# Patient Record
Sex: MALE | Race: WHITE | NOT HISPANIC OR LATINO | URBAN - METROPOLITAN AREA
[De-identification: names, ages, dates, MRNs, and addresses within clinical notes are randomized per-mention and may not be internally consistent; named-entity substitution may affect disease eponyms.]

---
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
## 2022-05-18 ENCOUNTER — OFFICE VISIT (OUTPATIENT)
Dept: URGENT CARE | Facility: PHYSICIAN GROUP | Age: 22
End: 2022-05-18
Payer: COMMERCIAL

## 2022-05-18 ENCOUNTER — HOSPITAL ENCOUNTER (OUTPATIENT)
Facility: MEDICAL CENTER | Age: 22
End: 2022-05-18
Attending: PHYSICIAN ASSISTANT
Payer: COMMERCIAL

## 2022-05-18 VITALS
SYSTOLIC BLOOD PRESSURE: 110 MMHG | RESPIRATION RATE: 16 BRPM | DIASTOLIC BLOOD PRESSURE: 68 MMHG | OXYGEN SATURATION: 97 % | HEIGHT: 71 IN | BODY MASS INDEX: 17.78 KG/M2 | WEIGHT: 127 LBS | HEART RATE: 79 BPM | TEMPERATURE: 98.6 F

## 2022-05-18 DIAGNOSIS — R50.9 FEVER, UNSPECIFIED FEVER CAUSE: ICD-10-CM

## 2022-05-18 DIAGNOSIS — M79.10 MYALGIA: ICD-10-CM

## 2022-05-18 DIAGNOSIS — Z20.828 EXPOSURE TO INFLUENZA: ICD-10-CM

## 2022-05-18 DIAGNOSIS — R68.83 CHILLS: ICD-10-CM

## 2022-05-18 LAB
FLUAV+FLUBV AG SPEC QL IA: NEGATIVE
INT CON NEG: NEGATIVE
INT CON POS: POSITIVE

## 2022-05-18 PROCEDURE — 0240U HCHG SARS-COV-2 COVID-19 NFCT DS RESP RNA 3 TRGT MIC: CPT

## 2022-05-18 PROCEDURE — 87804 INFLUENZA ASSAY W/OPTIC: CPT | Performed by: PHYSICIAN ASSISTANT

## 2022-05-18 PROCEDURE — 99203 OFFICE O/P NEW LOW 30 MIN: CPT | Mod: CS | Performed by: PHYSICIAN ASSISTANT

## 2022-05-18 RX ORDER — OSELTAMIVIR PHOSPHATE 75 MG/1
75 CAPSULE ORAL 2 TIMES DAILY
Qty: 10 CAPSULE | Refills: 0 | Status: SHIPPED | OUTPATIENT
Start: 2022-05-18

## 2022-05-18 NOTE — PROGRESS NOTES
"Subjective:   Alfreda Freeman is a 21 y.o. male who presents for Chills (Body sweats, headaches, body aches, mild nausea. Onset 1 day. )     Had been with a person that was positive for Influenza.  Symptoms started yesterday.  Mild nausea, no vomiting, HA, myalgias including LBP, cold sweats.  Low grade fever.  No rash.  No covid concerns.  Vaccinated.  Minimal cough, no SOB.        Medications:  This patient does not have an active medication from one of the medication groupers.    Allergies:             Patient has no known allergies.    Surgical History:       No past surgical history on file.    Past Social Hx:  Alfreda Freeman  reports that he has quit smoking. He quit after 0.05 years of use. He has never used smokeless tobacco. He reports current alcohol use. He reports current drug use. Frequency: 7.00 times per week. Drugs: Marijuana and Inhaled.     Past Family Hx:   Alfreda Freeman family history includes Alcohol/Drug in his maternal grandmother and paternal grandfather; Cancer in his paternal grandmother; Diabetes in his maternal grandmother and paternal grandfather; Heart Disease in his maternal grandmother; Hyperlipidemia in his maternal grandmother and paternal grandfather; Hypertension in his maternal grandmother; Lung Disease in his sister and sister.       Problem list, medications, and allergies reviewed by myself today in Epic.     Objective:     /68 (BP Location: Left arm, Patient Position: Sitting, BP Cuff Size: Adult)   Pulse 79   Temp 37 °C (98.6 °F) (Temporal)   Resp 16   Ht 1.803 m (5' 11\")   Wt 57.6 kg (127 lb)   SpO2 97%   BMI 17.71 kg/m²     Physical Exam  Vitals and nursing note reviewed.   Constitutional:       General: He is not in acute distress.     Appearance: Normal appearance.   HENT:      Head: Normocephalic.      Jaw: No trismus.      Right Ear: Tympanic membrane is not erythematous. Tympanic membrane has decreased mobility.      Left Ear: Tympanic membrane is not " erythematous. Tympanic membrane has decreased mobility.      Nose: Congestion and rhinorrhea present.      Mouth/Throat:      Mouth: Mucous membranes are moist.      Palate: No lesions.      Pharynx: Uvula midline. Posterior oropharyngeal erythema present. No oropharyngeal exudate.      Tonsils: No tonsillar exudate.   Eyes:      General:         Right eye: No discharge.         Left eye: No discharge.      Extraocular Movements: Extraocular movements intact.   Cardiovascular:      Rate and Rhythm: Normal rate and regular rhythm.      Pulses: Normal pulses.      Heart sounds: Normal heart sounds.   Pulmonary:      Effort: Pulmonary effort is normal. No accessory muscle usage or respiratory distress.      Breath sounds: Normal breath sounds. No stridor or decreased air movement. No wheezing.   Abdominal:      Tenderness: There is no abdominal tenderness.   Musculoskeletal:      Cervical back: Normal range of motion.   Lymphadenopathy:      Cervical: No cervical adenopathy.   Skin:     Findings: No rash.   Neurological:      Mental Status: He is alert.   Psychiatric:         Behavior: Behavior is cooperative.       Rapid flu negative  Assessment/Plan:     Diagnosis and Associated Orders:     1. Myalgia  - POCT Influenza A/B  - CoV-2 and Flu A/B by PCR (24 hour In-House): Collect NP swab in VTM; Future  - oseltamivir (TAMIFLU) 75 MG Cap; Take 1 Capsule by mouth 2 times a day.  Dispense: 10 Capsule; Refill: 0    2. Fever, unspecified fever cause  - POCT Influenza A/B  - CoV-2 and Flu A/B by PCR (24 hour In-House): Collect NP swab in VTM; Future  - oseltamivir (TAMIFLU) 75 MG Cap; Take 1 Capsule by mouth 2 times a day.  Dispense: 10 Capsule; Refill: 0    3. Chills  - POCT Influenza A/B  - CoV-2 and Flu A/B by PCR (24 hour In-House): Collect NP swab in VTM; Future  - oseltamivir (TAMIFLU) 75 MG Cap; Take 1 Capsule by mouth 2 times a day.  Dispense: 10 Capsule; Refill: 0    4. Exposure to influenza  - oseltamivir (TAMIFLU)  75 MG Cap; Take 1 Capsule by mouth 2 times a day.  Dispense: 10 Capsule; Refill: 0        Comments/MDM:    Rapid flu negative.  Given recent influenza exposure we will collect COVID/flu PCR testing.  We will initiate treatment with Tamiflu given symptoms and exposure.    Advised patient symptoms are viral in etiology, recommend supportive care. Increased fluids and rest.  Recommend over-the-counter cold and flu medications and Tylenol and/or ibuprofen for symptomatic relief and fevers.  Discussed use of nedi-pot, humidifier, and Flonase nasal spray as well.  Discussed good hand hygiene and ways to decrease spread of disease.  Follow-up with PCP return for reevaluation if symptoms persist/worsen.  Patient offered discharge instructions regarding flu.  The patient demonstrated a good understanding and agreed with the treatment plan.     I personally reviewed prior external notes and test results pertinent to today's visit.  Red flags discussed as well as indications to present to the Emergency Department.  Supportive care, natural history, differential diagnoses, and indications for immediate follow-up discussed.  Patient expresses understanding and agrees to plan.  Patient denies any other questions or concerns.    Follow-up with the primary care physician for recheck, reevaluation, and consideration of further management.      Please note that this dictation was created using voice recognition software. I have made a reasonable attempt to correct obvious errors, but I expect that there are errors of grammar and possibly content that I did not discover before finalizing the note.    This note was electronically signed by Charlotte Ríos PA-C

## 2022-05-18 NOTE — LETTER
May 18, 2022    To Whom It May Concern:         This is confirmation that Alfreda Freeman attended his scheduled appointment with Charlotte Ríos P.A.-C. on 5/18/22.  Please excuse patient from work while testing is being done through 5/20.         If you have any questions please do not hesitate to call me at the phone number listed below.    Sincerely,          Charlotte Ríos P.A.-C.  [phone]

## 2022-05-19 LAB
FLUAV RNA SPEC QL NAA+PROBE: POSITIVE
FLUBV RNA SPEC QL NAA+PROBE: NEGATIVE
SARS-COV-2 RNA RESP QL NAA+PROBE: NOTDETECTED
SPECIMEN SOURCE: ABNORMAL